# Patient Record
Sex: FEMALE | Race: WHITE | NOT HISPANIC OR LATINO | Employment: FULL TIME | URBAN - METROPOLITAN AREA
[De-identification: names, ages, dates, MRNs, and addresses within clinical notes are randomized per-mention and may not be internally consistent; named-entity substitution may affect disease eponyms.]

---

## 2018-01-05 ENCOUNTER — OFFICE VISIT (OUTPATIENT)
Dept: FAMILY MEDICINE CLINIC | Facility: CLINIC | Age: 23
End: 2018-01-05

## 2018-01-05 VITALS
OXYGEN SATURATION: 99 % | WEIGHT: 129 LBS | HEIGHT: 64 IN | HEART RATE: 84 BPM | TEMPERATURE: 98.3 F | SYSTOLIC BLOOD PRESSURE: 112 MMHG | DIASTOLIC BLOOD PRESSURE: 70 MMHG | BODY MASS INDEX: 22.02 KG/M2

## 2018-01-05 DIAGNOSIS — L30.9 DERMATITIS: Primary | ICD-10-CM

## 2018-01-05 PROCEDURE — 99213 OFFICE O/P EST LOW 20 MIN: CPT | Performed by: PHYSICIAN ASSISTANT

## 2018-01-05 RX ORDER — METRONIDAZOLE 10 MG/G
GEL TOPICAL DAILY
Qty: 60 G | Refills: 1 | Status: SHIPPED | OUTPATIENT
Start: 2018-01-05

## 2018-01-05 RX ORDER — NORETHINDRONE ACETATE AND ETHINYL ESTRADIOL, ETHINYL ESTRADIOL AND FERROUS FUMARATE 1MG-10(24)
KIT ORAL
COMMUNITY
Start: 2018-01-02

## 2018-01-08 NOTE — PROGRESS NOTES
Subjective   Sita Lou is a 22 y.o. female    History of Present Illness  Patient comes in today for evaluation of rash that has been on face for the past several weeks.  She states that it itches a little bit, slight burning.  No change in skin care products around the time this started.  Additionally patient needs refills on her birth control pill.  Menstrual cycles are regular, no history of problems with this birth control pill, no contraindications.  The following portions of the patient's history were reviewed and updated as appropriate: allergies, current medications, past social history and problem list    Review of Systems   Constitutional: Negative for fever.   Musculoskeletal: Negative for arthralgias.   Skin: Positive for color change and rash. Negative for pallor and wound.       Objective     Vitals:    01/05/18 1203   BP: 112/70   Pulse: 84   Temp: 98.3 °F (36.8 °C)   SpO2: 99%       Physical Exam   Constitutional: She appears well-developed and well-nourished. No distress.   Skin: Skin is warm and dry. Rash noted. She is not diaphoretic. There is erythema. No pallor.   Fine maculopapular rash around perioral region and skin folds below nasolabial edges.   Nursing note and vitals reviewed.      Assessment/Plan     Diagnoses and all orders for this visit:    Dermatitis  -     metroNIDAZOLE (METROGEL) 1 % gel; Apply  topically Daily. Up to twice daily as needed for facial rash    Other orders  -     LO LOESTRIN FE 1 MG-10 MCG / 10 MCG tablet;